# Patient Record
Sex: FEMALE | Race: OTHER
[De-identification: names, ages, dates, MRNs, and addresses within clinical notes are randomized per-mention and may not be internally consistent; named-entity substitution may affect disease eponyms.]

---

## 2017-01-10 ENCOUNTER — HOSPITAL ENCOUNTER (OUTPATIENT)
Dept: HOSPITAL 62 - WI | Age: 47
End: 2017-01-10
Attending: FAMILY MEDICINE
Payer: COMMERCIAL

## 2017-01-10 DIAGNOSIS — Z12.31: Primary | ICD-10-CM

## 2017-01-10 PROCEDURE — 77067 SCR MAMMO BI INCL CAD: CPT

## 2017-01-10 PROCEDURE — G0202 SCR MAMMO BI INCL CAD: HCPCS

## 2017-01-10 NOTE — WOMENS IMAGING REPORT
EXAM DESCRIPTION:  BILAT SCREENING MAMMO W/CAD



COMPLETED DATE/TIME:  1/10/2017 11:45 am



REASON FOR STUDY:  Z12.31 ROUTINE SCREENING MAMMO Z12.31  ENCNTR SCREEN MAMMOGRAM FOR MALIGNANT NEOPL
[facility] OF [place]



COMPARISON:  None.



TECHNIQUE:  Standard craniocaudal and mediolateral oblique views of each breast recorded using [employer]
l acquisition.



LIMITATIONS:  None.



FINDINGS:  No masses, calcifications or architectural distortion. No areas of suspicion.

Read with the assistance of CAD.

.[facility] - R2 Cenova Version 1.3

.[facility] - R2 Cenova Version 1.3

.[facility] - R2 Cenova Version 2.4

.[facility] - R2 Cenova Version 2.4

.[facility] - R2  Version 9.2



BREAST DENSITY:  c. The breasts are heterogeneously dense, which may obscure small masses.



BIRAD:  1 NEGATIVE



RECOMMENDATION:  ROUTINE SCREENING



COMMENT:  PATIENT NOTIFIED BY LETTER.

The American College of Radiology recommends an annual screening mammogram for women aged 40 years or
 over. Each patient will receive a reminder prior to the anniversary date of her mammogram.

The American College of Radiology (ACR) has developed recommendations for screening MRI of the breast
s in certain patient populations, to be used in conjunction with mammography.  Breast MRI surveillanc
e may be appropriate for women with more than 20% lifetime risk of developing breast cancer  as deter
mined by genetic testing, significant family history of the disease, or history of mantle radiation f
or Hodgkins Disease.  ACR Practice Guidelines 2008.



TECHNICAL DOCUMENTATION:  FINDING NUMBER: (1)

ASSESSMENT: (1)

JOB ID:  [id number]

 2011 [employer]- All Rights Reserved

## 2019-11-15 ENCOUNTER — HOSPITAL ENCOUNTER (EMERGENCY)
Dept: HOSPITAL 62 - ER | Age: 49
Discharge: HOME | End: 2019-11-15
Payer: COMMERCIAL

## 2019-11-15 VITALS — SYSTOLIC BLOOD PRESSURE: 122 MMHG | DIASTOLIC BLOOD PRESSURE: 72 MMHG

## 2019-11-15 DIAGNOSIS — E78.00: ICD-10-CM

## 2019-11-15 DIAGNOSIS — R06.02: ICD-10-CM

## 2019-11-15 DIAGNOSIS — Z79.899: ICD-10-CM

## 2019-11-15 DIAGNOSIS — R00.1: ICD-10-CM

## 2019-11-15 DIAGNOSIS — E87.5: ICD-10-CM

## 2019-11-15 DIAGNOSIS — R07.89: Primary | ICD-10-CM

## 2019-11-15 LAB
ADD MANUAL DIFF: NO
ALBUMIN SERPL-MCNC: 4.7 G/DL (ref 3.5–5)
ALP SERPL-CCNC: 54 U/L (ref 38–126)
ANION GAP SERPL CALC-SCNC: 11 MMOL/L (ref 5–19)
AST SERPL-CCNC: 27 U/L (ref 14–36)
BASOPHILS # BLD AUTO: 0.1 10^3/UL (ref 0–0.2)
BASOPHILS NFR BLD AUTO: 0.8 % (ref 0–2)
BILIRUB DIRECT SERPL-MCNC: 0 MG/DL (ref 0–0.4)
BILIRUB SERPL-MCNC: 0.4 MG/DL (ref 0.2–1.3)
BUN SERPL-MCNC: 16 MG/DL (ref 7–20)
CALCIUM: 10 MG/DL (ref 8.4–10.2)
CHLORIDE SERPL-SCNC: 103 MMOL/L (ref 98–107)
CK MB SERPL-MCNC: 0.89 NG/ML (ref ?–4.55)
CK SERPL-CCNC: 129 U/L (ref 30–135)
CO2 SERPL-SCNC: 28 MMOL/L (ref 22–30)
EOSINOPHIL # BLD AUTO: 0.1 10^3/UL (ref 0–0.6)
EOSINOPHIL NFR BLD AUTO: 1.9 % (ref 0–6)
ERYTHROCYTE [DISTWIDTH] IN BLOOD BY AUTOMATED COUNT: 13.4 % (ref 11.5–14)
GLUCOSE SERPL-MCNC: 91 MG/DL (ref 75–110)
HCT VFR BLD CALC: 39.9 % (ref 36–47)
HGB BLD-MCNC: 13.6 G/DL (ref 12–15.5)
LYMPHOCYTES # BLD AUTO: 1.5 10^3/UL (ref 0.5–4.7)
LYMPHOCYTES NFR BLD AUTO: 22.3 % (ref 13–45)
MCH RBC QN AUTO: 31.9 PG (ref 27–33.4)
MCHC RBC AUTO-ENTMCNC: 34 G/DL (ref 32–36)
MCV RBC AUTO: 94 FL (ref 80–97)
MONOCYTES # BLD AUTO: 0.5 10^3/UL (ref 0.1–1.4)
MONOCYTES NFR BLD AUTO: 7.1 % (ref 3–13)
NEUTROPHILS # BLD AUTO: 4.5 10^3/UL (ref 1.7–8.2)
NEUTS SEG NFR BLD AUTO: 67.9 % (ref 42–78)
PLATELET # BLD: 303 10^3/UL (ref 150–450)
POTASSIUM SERPL-SCNC: 5.1 MMOL/L (ref 3.6–5)
PROT SERPL-MCNC: 8.4 G/DL (ref 6.3–8.2)
RBC # BLD AUTO: 4.25 10^6/UL (ref 3.72–5.28)
TOTAL CELLS COUNTED % (AUTO): 100 %
TROPONIN I SERPL-MCNC: < 0.012 NG/ML
WBC # BLD AUTO: 6.6 10^3/UL (ref 4–10.5)

## 2019-11-15 PROCEDURE — 82553 CREATINE MB FRACTION: CPT

## 2019-11-15 PROCEDURE — 85025 COMPLETE CBC W/AUTO DIFF WBC: CPT

## 2019-11-15 PROCEDURE — 82550 ASSAY OF CK (CPK): CPT

## 2019-11-15 PROCEDURE — 80053 COMPREHEN METABOLIC PANEL: CPT

## 2019-11-15 PROCEDURE — 71046 X-RAY EXAM CHEST 2 VIEWS: CPT

## 2019-11-15 PROCEDURE — 93010 ELECTROCARDIOGRAM REPORT: CPT

## 2019-11-15 PROCEDURE — 93005 ELECTROCARDIOGRAM TRACING: CPT

## 2019-11-15 PROCEDURE — 99285 EMERGENCY DEPT VISIT HI MDM: CPT

## 2019-11-15 PROCEDURE — 36415 COLL VENOUS BLD VENIPUNCTURE: CPT

## 2019-11-15 PROCEDURE — 84484 ASSAY OF TROPONIN QUANT: CPT

## 2019-11-15 NOTE — ER DOCUMENT REPORT
ED General





- General


Chief Complaint: Chest Pain


Stated Complaint: CHEST PAIN/DIFFICULTY BREATHING


Time Seen by Provider: 11/15/19 10:20


Primary Care Provider: 


AVIVA GUAN MD [Primary Care Provider] - Follow up as needed


TRAVEL OUTSIDE OF THE [place] IN LAST 30 DAYS: No





- HPI


Notes: 





Patient is a 48-year-old female who presents emergency department for evaluation

of chest pain.  She states that on Wednesday night she developed nausea, 

vomiting, diarrhea.  She states the next night she had cramping but behind her 

knees, left side worse than right.  Last night she developed a chest tightness. 

It does not radiate, is in the substernal area.  She states she felt somewhat 

short of breath with this.  She states that she had 2 or 3 episodes of this last

night.  It lasted 30 seconds to 2 minutes.  No aggravating or relieving factors.

 She states this morning when she woke up the chest tightness was there, and was

constant for several hours.  It waxed and waned in intensity but never really 

went away.  She again denies any aggravating or alleviating factors.  She denies

any chest tightness or pain at this time.  She states she believes this might be

anxiety.  She has no personal history of blood clots, no family history of blood

clots.  She has no personal history of cancer, no prolonged immobilization, no 

recent surgeries.  She is not on oral contraceptives, does not smoke.





- Related Data


Allergies/Adverse Reactions: 


                                        





No Known Allergies Allergy (Verified 11/15/19 09:46)


   








Home Medications: multivitamin.  fish oil.  atorvastatin.  hydrochlorathizide.  

bp





Past Medical History





- General


Information source: Patient





- Social History


Smoking Status: Never Smoker


Chew tobacco use (# tins/day): No


Frequency of alcohol use: Occasional


Drug Abuse: None


Family History: Reviewed & Not Pertinent


Patient has suicidal ideation: No


Patient has homicidal ideation: No





- Past Medical History


Cardiac Medical History: Reports: Hx Hypercholesterolemia, Hx Hypertension - 

pre-htn/NO MEDS


   Denies: Hx Heart Attack


Neurological Medical History: Denies: Hx Cerebrovascular Accident, Hx Seizures


GI Medical History: Denies: Hx Hepatitis, Hx Hiatal Hernia, Hx Ulcer


Infectious Medical History: Denies: Hx Hepatitis


Past Surgical History: Denies: Hx Hysterectomy, Hx Mastectomy, Hx Open Heart 

Surgery, Hx Pacemaker





Review of Systems





- Review of Systems


Constitutional: No symptoms reported


EENT: No symptoms reported


Cardiovascular: No symptoms reported


Respiratory: See HPI


Gastrointestinal: See HPI


Genitourinary: No symptoms reported


Musculoskeletal: No symptoms reported


Skin: No symptoms reported


Neurological/Psychological: No symptoms reported





Physical Exam





- Vital signs


Vitals: 





                                        











Temp Pulse Resp BP Pulse Ox


 


 97.9 F   55 L  18   128/78 H  100 


 


 11/15/19 09:36  11/15/19 09:36  11/15/19 09:36  11/15/19 09:36  11/15/19 09:36














- Notes


Notes: 





Vital signs reviewed, please refer to chart. Head is normocephalic, atraumatic. 

Pupils equal round, reactive to light.  Neck is supple without meningismus.  

Heart is regular rate and rhythm.  Lungs are clear to auscultation bilaterally. 

Abdomen is soft, nontender, normoactive bowel sounds throughout.  Extremities 

without cyanosis, clubbing. Posterior calves are nontender.  Peripheral pulses 

are equal.  Skin is warm and dry.  Patient is awake, alert, neurological exam is

nonfocal.





Course





- Re-evaluation


Re-evalutation: 





11/15/19 17:44


Patient presents emergency department for evaluation of chest tightness and 

pain.  She had nausea and vomiting earlier this week.  Her nausea and vomiting 

resolved.  She had some chest tightness associated with this.  She states that 

this was not exertional.  It certainly would be atypical for any sort of anginal

pain.  Laboratory investigations and EKG were obtained.  Troponins were 

undetectable x2.  Patient is chest pain-free at this time.  She has no 

significant risk factors for PE.  Her vitals are stable, her heart rate is in 

the 60s.  At this point I expanded the patient I cannot fully rule out coronary 

artery disease, especially given her risk factors.  I strongly encouraged her to

follow-up with her primary care provider, and she will do so next week.  She 

understands that if she develops worsening or new concerning symptoms of any 

sort she needs to return immediately to the emergency department for evaluation.


11/15/19 17:45


I did discuss with the patient that her potassium was mildly elevated.  She 

denies excessive oral potassium intake.  I recommended that she follow-up with 

her primary care provider in regards to this as well.





- Vital Signs


Vital signs: 





                                        











Temp Pulse Resp BP Pulse Ox


 


 97.9 F   55 L  18   128/78 H  100 


 


 11/15/19 09:36  11/15/19 09:36  11/15/19 09:36  11/15/19 09:36  11/15/19 09:36














- Laboratory


Result Diagrams: 


                                 11/15/19 10:05





                                 11/15/19 10:05


Laboratory results interpreted by me: 





                                        











  11/15/19





  10:05


 


Potassium  5.1 H


 


Total Protein  8.4 H














- Diagnostic Test


Radiology reviewed: Reports reviewed


Radiology results interpreted by me: 





11/15/19 17:45





                                        





Chest X-Ray  11/15/19 00:00


IMPRESSION:  NO ACUTE RADIOGRAPHIC FINDING IN THE CHEST.


 














- EKG Interpretation by Me


Additional EKG results interpreted by me: 





11/15/19 17:45


Sinus bradycardia with a rate of 54 bpm.  Normal axis and intervals, no acute ST

changes concerning for ischemia or infarction





Discharge





- Discharge


Clinical Impression: 


 Chest tightness, Hyperkalemia





Condition: Stable


Disposition: HOME, SELF-CARE


Instructions:  Chest Pain of Unclear Cause ([facility])


Additional Instructions: 


No clear cause was found for your chest pain today.  Your work-up here was 

largely unremarkable, with the exception of a mildly elevated potassium.  Please

follow-up with your primary care provider in regards to this.  If you develop 

worsening pain, or new or concerning symptoms of any sort, please return 

immediately to the emergency department for evaluation.


Referrals: 


AVIVA GUAN MD [Primary Care Provider] - Follow up as needed

## 2019-11-15 NOTE — RADIOLOGY REPORT ([facility])
EXAM DESCRIPTION:  CHEST 2 VIEWS



COMPLETED DATE/TIME:  11/15/2019 10:20 am



REASON FOR STUDY:  chest pain protocol



COMPARISON:  None.



EXAM PARAMETERS:  NUMBER OF VIEWS: two views

TECHNIQUE: Digital Frontal and Lateral radiographic views of the chest acquired.

RADIATION DOSE: NA

LIMITATIONS: none



FINDINGS:  LUNGS AND PLEURA: No opacities, masses or pneumothorax. No pleural effusion.

MEDIASTINUM AND HILAR STRUCTURES: No masses or contour abnormalities.

HEART AND VASCULAR STRUCTURES: Heart normal size.  No evidence for failure.

BONES: No acute findings.

HARDWARE: None in the chest.

OTHER: No other significant finding.



IMPRESSION:  NO ACUTE RADIOGRAPHIC FINDING IN THE CHEST.



TECHNICAL DOCUMENTATION:  JOB ID:  [id number]

 2011 [employer]- All Rights Reserved



Reading location - IP/workstation name: [email or web address]